# Patient Record
Sex: FEMALE | Race: BLACK OR AFRICAN AMERICAN | NOT HISPANIC OR LATINO | Employment: FULL TIME | URBAN - METROPOLITAN AREA
[De-identification: names, ages, dates, MRNs, and addresses within clinical notes are randomized per-mention and may not be internally consistent; named-entity substitution may affect disease eponyms.]

---

## 2018-01-24 DIAGNOSIS — H90.3 SENSORINEURAL HEARING LOSS, BILATERAL: Primary | ICD-10-CM

## 2018-01-31 ENCOUNTER — OFFICE VISIT (OUTPATIENT)
Dept: AUDIOLOGY | Facility: CLINIC | Age: 58
End: 2018-01-31
Payer: COMMERCIAL

## 2018-01-31 DIAGNOSIS — H90.3 SENSORINEURAL HEARING LOSS, BILATERAL: ICD-10-CM

## 2018-01-31 NOTE — MR AVS SNAPSHOT
After Visit Summary   2018    Sara Ritter    MRN: [id number]           Patient Information     Date Of Birth          1960        Visit Information        Provider Department      2018 8:00 AM Chiquis Carpenter AuD M [facility] Audiology        Today's Diagnoses     Sensorineural hearing loss, bilateral           Follow-ups after your visit        Your next 10 appointments already scheduled     Aug 13, 2018 10:00 AM CDT   Cochlear Implant Return with Abbey Price [facility] Audiology ([facility] [place])    [address] [place] [id number] [phone]              Who to contact     Please call your clinic at [phone] to:    Ask questions about your health    Make or cancel appointments    Discuss your medicines    Learn about your test results    Speak to your doctor   If you have compliments or concerns about an experience at your clinic, or if you wish to file a complaint, please contact [facility] Patient Relations at [phone] or email us at [email or web address]         Additional Information About Your Visit        MyChart Information     [employer] is an electronic gateway that provides easy, online access to your medical records. With [employer], you can request a clinic appointment, read your test results, renew a prescription or communicate with your care team.     To sign up for [employer] visit the website at [email or web address]   You will be asked to enter the access code listed below, as well as some personal information. Please follow the directions to create your username and password.     Your access code is: [email or web address]  Expires: 3/12/2018  4:18 PM     Your access code will  in 90 days. If you need help or a new code, please contact your [facility] Clinic or call [phone] for assistance.        Care EveryWhere ID     This is your Care EveryWhere  ID. This could be used by other organizations to access your [place] medical records  [id number]         Blood Pressure from Last 3 Encounters:   No data found for BP    Weight from Last 3 Encounters:   No data found for Wt              We Performed the Following     AUDIOLOGY ADULT REFERRAL     LT: Diagnostic Analysis of CI 7 yrs & over, Subsequent Programming   (44917)     RT: Diagnostic Analysis of CI 7 yrs & over, Subsequent Programming   (70536)        Primary Care Provider Office Phone # Fax #    Alvaro Fisher [phone]       [facility] [address] [address]        Equal Access to Services     [facility] AH: Hadii aad ku hadasho Soomaali, waaxda luqadaha, qaybta kaalmada adeegyada, waxay idiin hayaan adekathryn sales . So [facility] [phone].    ATENCIÓN: Si habla español, tiene a bazan disposición servicios gratuitos de asistencia lingüística. Llame al [phone].    We comply with applicable federal civil rights laws and [place] laws. We do not discriminate on the basis of race, color, national origin, age, disability, sex, sexual orientation, or gender identity.            Thank you!     Thank you for choosing [facility] AUDIOLOGY  for your care. Our goal is always to provide you with excellent care. Hearing back from our patients is one way we can continue to improve our services. Please take a few minutes to complete the written survey that you may receive in the mail after your visit with us. Thank you!             Your Updated Medication List - Protect others around you: Learn how to safely use, store and throw away your medicines at www.disposemymeds.org.      Notice  As of 1/31/2018  8:45 AM    You have not been prescribed any medications.

## 2018-01-31 NOTE — PROGRESS NOTES
AUDIOLOGY REPORT    BACKGROUND: Dr. Blayne Calderon implanted Sara Ritter with binaural  Advanced [employer]ak[employer]iusn[employer] HiRes 90k cochlear implants due to bilateral severe to profound sensorineural hearing loss and lack of benefit from hearing aids.  She received the left implant 11/24/2003 (after failure of [place] 1.2) and the right implant 10/11/2007.  The right implant was repositioned in June 2010.  She favors her left implant.      PATIENT REPORT: Sara was seen today 1/31/18 for bilateral cochlear implant programming in Audiology at the [facility] [place].      Patient reports she needs programming because her  reports she is not hearing as well.  She also feels her speech isn't as clear because she cannot monitor her volume as well due to the need for reprogramming.        FITTING SESSION: Dr. Blayne Calderon, cochlear implant surgeon, ordered today's appointment. The patient came to the clinic for adjustment to the programs in the external speech processor and for assessment of the external components of the cochlear implant system. These components provide power and data to the internal device. Sound is only heard once the external portion is activated. Postoperative treatment, including device fitting and adjustment, audiologic assessments, and training are required at regular intervals. Testing can include electropsychophysical measures of threshold, comfort, and loudness balancing, which are completed to update the program.    Processor type: Christin CI Q90 bilaterally.  [place] backups bilaterally but did not bring today.  Filters were changed on the Christin CI processors.  Normal listening checks.      Headpiece type: Universal headpiece (UHP)  Magnet strength: Christin processors: 1 magnet bilaterally - no irritation.  [place] processors: Left 1 magnet, right 2 magnets - no irritation.    TEST RESULTS:   Electrode Impedances: Stable and normal on active electrodes  "bilaterally  Neural Response Testing: Did not test  Facial Stimulation: Absent  Tinnitus: Absent  Balance Problems: Absent  Pain/Discomfort: Absent  Strategies Tried: Optima-S     Christin CI Q90 Programs Bilaterally (Processors are initialized to work on either ear):  1. (Left 137, Right 95) Everyday program - Clear Voice low, Processor bo + Tmic, IDR 60, no compression  2. (Left 138, Right 96) EchoBlock program - Clear Voice low, Processor bo + Tmic, IDR 60, no compression  3. (Left 137, Right 95) ZoomControl with front focus (will switch with remote) - Clear Voice low, Processor bo + Tmic, IDR 60, no compression  4. (Left 139, Right 97) Biking program with WindBlock - Clear Voice low, Processor bo + Tmic, IDR 60, no compression  5. (Left 137, Right 95) Tmic2 only program - Clear Voice low, IDR 60, no compression  QuickSync is enabled for programs and volume.  Standby mode is active.      Did not adjust [place] processors today since patient did not bring them along.      Number of Channels per Program:   Right 13 (#1 and 16 have been off, 15 was also turned off today since patient feels sound more than she hears it)  Left 15 (#1 is off)    COMMENTS: Most comfortable (M) levels were reshaped bilaterally.  For the right side, they decreased in general.  For the left side, they increased in the middle frequencies.  Upon going live, speech wasn't as \"sharp\" and therefore, M levels were increased 5 units on high frequency electrodes in right ear.  Volume was slightly too soft and M levels were globally raised 8 units bilaterally to achieve comfort.  Patient reported volume and quality were significantly improved after the changes and that her own voice sounded much better.      SUMMARY AND RECOMMENDATIONS: Sara was seen for bilateral cochlear implant programming.  M levels were adjusted bilaterally.  Patient reports she needs to get to work this morning and cannot stay for speech perception testing so an appointment " was scheduled for August 2018 when she is off work for summer break.  She will return for programming as needed in the meantime.      The patient expressed understanding and agreement with this plan.        Delmer Syed, CCC-A  Licensed Audiologist  [place] #[id number]

## 2018-06-06 ENCOUNTER — TELEPHONE (OUTPATIENT)
Dept: AUDIOLOGY | Facility: CLINIC | Age: 58
End: 2018-06-06

## 2018-06-06 NOTE — TELEPHONE ENCOUNTER
Received email from patient requesting SoundWave file for cochlear implant be sent to [facility] at [facility] for a research study she'll be participating in.  Release of information form is on file (scanned in Media tab).  File was sent to lab.      Delmer Syed, CCC-A  Licensed Audiologist  [place] #[id number]

## 2018-08-13 ENCOUNTER — OFFICE VISIT (OUTPATIENT)
Dept: AUDIOLOGY | Facility: CLINIC | Age: 58
End: 2018-08-13
Payer: COMMERCIAL

## 2018-08-13 DIAGNOSIS — H90.3 SENSORY HEARING LOSS, BILATERAL: Primary | ICD-10-CM

## 2018-08-13 NOTE — PROGRESS NOTES
AUDIOLOGY REPORT    METHOD: Speech perception testing is conducted at regular intervals to determine the degree of benefit the patient is obtaining from the cochlear implant (CI). Tests are conducted using the cochlear implant without the benefit of lipreading. All tests are conducted in a sound-treated room. Perception of monosyllabic words and words in sentences are tested. Results usually show improvement over time. A decrease in performance indicates the need for re-programming of the prosthesis and/or replacement of components.     INTERVAL: Left 14 1/2 years, Right almost 11 years     BACKGROUND: Dr. Blayne Calderon implanted Sara Ritter with binaural  Advanced Bionics HiRes 90k cochlear implants due to bilateral severe to profound sensorineural hearing loss and lack of benefit from hearing aids.  She received the left implant 11/24/2003 (after failure of [place] 1.2) and the right implant 10/11/2007.  The right implant was repositioned in June 2010.  She favors her left implant.    Dr. Calderon ordered today's visit.       PATIENT REPORT: Sara was seen today 8/13/18 in Audiology at the [facility] [place] for speech perception testing and cochlear implant programming.     Of note, the patient has been wearing a research PSP processor on the right ear since June, as part of a music study.       TEST RESULTS:  45 minutes were spent assessing the patient s auditory rehabilitation status.  Today s evaluation was ordered by Dr. Calderon.    Device(s) used for Testing: Synthorxnics Christin CI Q90 cochlear implant sound processors bilaterally. Normal listening check after microphone filter change.    Christin processors: 1 magnet bilaterally - no irritation.  [place] processors: Left 1 magnet, right 2 magnets - no irritation.    Tympanograms: Right normal, Left slightly shallow - similar to previous recordings and no otologic symptoms.     Unaided hearing: Left did not test since no  measurable hearing in past (profound sensorineural hearing loss); Right no measurable hearing today (only vibrotactile responses) suggesting profound sensorineural hearing loss (since no response to bone in past).      Soundfield Thresholds:   Left CI: Detection in mild loss rising to normal range (decreased 10 dB at 250 Hz re: 2/18/16 testing)  Right CI: Detection in normal to borderline normal range (stable)    CNC Words Test:  The patient repeats 25 single syllable words, auditory only. The words are presented at 60 dB SPL (conversational level) delivered from a CD player.    Preoperative Performance: 2%    Left replacement CI:   In 2004, 6 months at 70 dB SPL: 50-70%   8 years at 60 dB SPL: 88%  12 years at 60 dB SPL: 84%  14 1/2 years (today) at 60 dB A: 84%    Right CI:  In 2008, 3 months at 60 dB SPL: 56%  5 years at 60 dB SPL: 80%  8 years at 60 dB SPL: 68%  Almost 11 years (today) at 60 dB A: 68%    Bilateral CI:  5 years at 60 dB SPL: 92%  8 years at 60 dB SPL: 92%  Almost 11 years (today): did not test due to high score in left unilateral condition (unlikely to see bilateral advantage)    The Hearing in Noise Test (HINT):  The patient repeats 20 sentences, auditory only.   The sentences are presented in each condition at 60 dB SPL (conversational level)delivered from a CD player.    Preoperative Performance: 0%    Left replacement CI:   In 2004, 6 months at 70 dB SPL: 75-87%   8 years at 60 dB SPL: 100%  Did not test today due to previous ceiling results.     Right CI:  In 2008, 3 months at 60 dB SPL: 51%  5 years at 60 dB SPL: 100%  Did not test today due to previous ceiling results.       AzBio Sentences Test:  The patient repeats 20 sentences, auditory only.  The sentences are presented at 60 dB A (conversational level) delivered from a CD player.     Preoperative Performance: Did not test    Left CI:  12 years: 99% quiet; 90% with +10 dB signal to noise ratio (SNR)  14 1/2 years (today): 97% quiet;  58% with +10 dB signal to noise ratio (SNR)    Right CI:  8 years: 89% quiet; 78% with +10 dB SNR  Almost 11 years (today): 95% quiet; 65% with +10 dB SNR    Bilateral CI:  April 2012 at +5 dB SNR: Non-Clear voice program: 23%; Clear Voice medium: 58%   8 years: 64% with +5 dB SNR (Did not test in quiet or +10 dB SNR due to high performance in unilateral conditions)   Almost 11 years (today): 77% with +10 dB SNR (Did not test in quiet due to high performance in unilateral conditions)     Of note, the noise scores were poorer today while the patient denies any subjective changes.  This could be related to different calibration since the 2016 results were obtained with our old equipment in the old facility.  It could also be related to the patient's lack of guessing today, her recent use of a research processor and/or the need for programming on the left side (audibility decreased at 250 Hz).      After testing, programming was completed on the left side, due to the changes in audibility at 250 Hz.  Impedances were tested bilaterally.       FITTING SESSION: Dr. Blayne Calderon, cochlear implant surgeon, ordered today's appointment. The patient came to the clinic for adjustment to the programs in the external speech processor and for assessment of the external components of the cochlear implant system. These components provide power and data to the internal device. Sound is only heard once the external portion is activated. Postoperative treatment, including device fitting and adjustment, audiologic assessments, and training are required at regular intervals. Testing can include electropsychophysical measures of threshold, comfort, and loudness balancing, which are completed to update the program.    Processor type: Christin CI Q90 bilaterally.  [place] backups bilaterally but did not bring today.  Filters were changed on the Christin CI processors.  Normal listening checks.      Headpiece type: Universal headpiece  (UHP)  Magnet strength: Christin processors: 1 magnet bilaterally - no irritation.  [place] processors: Left 1 magnet, right 2 magnets - no irritation.    TEST RESULTS:   Electrode Impedances: Essentially stable and normal on active electrodes bilaterally; SoundWave recommended a slight increase to the pulse width on left side (increased from 33.2 to 37.7).  No changes were recommended on the right side.    Neural Response Testing: Did not test  Facial Stimulation: Absent  Tinnitus: Absent  Balance Problems: Absent  Pain/Discomfort: Absent  Strategies Tried: Optima-S     Christin CI Q90 Programs Bilaterally (Processors are initialized to work on either ear):  1. (Left 140, Right 95) Everyday program - Clear Voice low, Processor bo + Tmic, IDR 60, no compression  2. (Left 141, Right 96) EchoBlock program - Clear Voice low, Processor bo + Tmic, IDR 60, no compression  3. (Left 140, Right 95) ZoomControl with front focus (will switch with remote) - Clear Voice low, Processor bo + Tmic, IDR 60, no compression  4. (Left 142, Right 97) Biking program with WindBlock - Clear Voice low, Processor bo + Tmic, IDR 60, no compression  5. (Left 140, Right 95) Tmic2 only program - Clear Voice low, IDR 60, no compression  QuickSync is enabled for programs and volume.  Standby mode is active.      Did not adjust [place] processors today since patient did not bring them along.      Number of Channels per Program:   Right 13 (#1 and 16 have been off, 15 was also turned off today since patient feels sound more than she hears it)  Left 15 (#1 is off)    For the right side, impedances were stable and no pulse width changes were recommended.  Since audibility was stable, no programming changes were recommended.  Programming charges were modified since only impedances were checked.     For the left side, pulse width was widened from 33.2 to 37.7.  Most comfortable (M) levels were measured in the low frequencies, due to the change in  audibility noted on the audiogram at 250 Hz.  On electrode #2 (the most apical active electrode; #1 is off), M level increased 20 units.  M levels did not change on other adjacent electrodes.    After the changes, both implants were activated and the patient felt they were balanced and the sound quality was good.        SUMMARY AND RECOMMENDATIONS: Sara was seen for bilateral cochlear implant testing and right cochlear implant programming.  Test results suggest stable speech perception scores in quiet conditions.  Noise scores decreased but could have been affected by calibration/equipment, lack of guessing, recent use of a research processor or need for programming.  We will monitor.  Today's scores were more in line with typical noise performance.      Since audibility decreased at 250 Hz in the left ear, programming was completed and pulse width was also increased based on impedances.  Impedances and audibility were stable in right CI so no programming changes were made (charges were modified since only impedances were checked on right side).      Middle ear status is consistent with previous visits.  In the unaided condition, the patient has a profound sensorineural hearing loss bilaterally (no measurable hearing in either ear).  Patient will continue to follow up with Dr. Calderon for any medical issues related to her cochlear implants or ears in the future.    I've advised her to return for testing in 1 year or sooner programming as needed.  She would like to schedule a programming visit before the end of the year, in case it is needed.  She will bring backup processors for updating at her return visit.      The patient expressed understanding and agreement with this plan.      Delmer Syed, CCC-A  Licensed Audiologist  [place] #[id number]      Enclosure: audiogram

## 2018-08-13 NOTE — MR AVS SNAPSHOT
After Visit Summary   2018    Sara Ritter    MRN: [id number]           Patient Information     Date Of Birth          1960        Visit Information        Provider Department      2018 10:00 AM Chiquis Carpenter AuD M [facility] Audiology        Today's Diagnoses     Sensory hearing loss, bilateral    -  1       Follow-ups after your visit        Who to contact     Please call your clinic at [phone] to:    Ask questions about your health    Make or cancel appointments    Discuss your medicines    Learn about your test results    Speak to your doctor            Additional Information About Your Visit        MyChart Information     [employer] is an electronic gateway that provides easy, online access to your medical records. With [employer], you can request a clinic appointment, read your test results, renew a prescription or communicate with your care team.     To sign up for [employer] visit the website at [email or web address]   You will be asked to enter the access code listed below, as well as some personal information. Please follow the directions to create your username and password.     Your access code is: [email or web address]  Expires: 10/28/2018  6:31 AM     Your access code will  in 90 days. If you need help or a new code, please contact your [facility] Clinic or call [phone] for assistance.        Care EveryWhere ID     This is your Care EveryWhere ID. This could be used by other organizations to access your [place] medical records  [id number]         Blood Pressure from Last 3 Encounters:   No data found for BP    Weight from Last 3 Encounters:   No data found for Wt              We Performed the Following     AUDIOGRAM/TYMPANOGRAM - INTERFACE     Audiometry/Air   (97358)     Eval of Aud Rehab Status (60 min)   (41575)     LT: Diagnostic Analysis of CI 7 yrs & over, Subsequent Programming   (77519)     RT: Diagnostic Analysis of CI 7 yrs & over,  Subsequent Programming   (95203)     Tympanometry (impedance - testing) (82118)        Primary Care Provider Office Phone # Fax #    Alvaro Fisher [phone]       [facility] [address] [place] [address]  [place] [address]        Equal Access to Services     [facility] : Hadii aad ku hadasho Soomaali, waaxda luqadaha, qaybta kaalmada adeegyada, waxay idiin hayaan adeeg kharash la'aan . So [facility] [phone].    ATENCIÓN: Si habla español, tiene a bazan disposición servicios gratuitos de asistencia lingüística. Chrissieame al [phone].    We comply with applicable federal civil rights laws and [place] laws. We do not discriminate on the basis of race, color, national origin, age, disability, sex, sexual orientation, or gender identity.            Thank you!     Thank you for choosing [facility] AUDIOLOGY  for your care. Our goal is always to provide you with excellent care. Hearing back from our patients is one way we can continue to improve our services. Please take a few minutes to complete the written survey that you may receive in the mail after your visit with us. Thank you!             Your Updated Medication List - Protect others around you: Learn how to safely use, store and throw away your medicines at www.disposemymeds.org.      Notice  As of 8/13/2018  4:08 PM    You have not been prescribed any medications.

## 2019-01-28 DIAGNOSIS — Z96.21 COCHLEAR IMPLANT STATUS: Primary | ICD-10-CM

## 2019-06-23 NOTE — PROGRESS NOTES
AUDIOLOGY REPORT    BACKGROUND: Dr. Blayne Calderon implanted Sara Ritter with binaural  [employer] HiRes 90k cochlear implants due to bilateral severe to profound sensorineural hearing loss and lack of benefit from hearing aids.  She received the left implant 11/24/2003 (after failure of [place] 1.2) and the right implant 10/11/2007.  The right implant was repositioned in June 2010.  She favors her left implant.    The patient is being seen on 6/24/19 for bilateral cochlear implant programming.     PATIENT REPORT: Patient reports she hasn't been understanding her  as well as in the past.  She has postponed programming since she was participating in a research study, which is now complete.  Patient would also like to create a music program.        FITTING SESSION: Dr. Blayne Calderon, cochlear implant surgeon, ordered today's appointment. The patient came to the clinic for adjustment to the programs in the external speech processor and for assessment of the external components of the cochlear implant system. These components provide power and data to the internal device. Sound is only heard once the external portion is activated. Postoperative treatment, including device fitting and adjustment, audiologic assessments, and training are required at regular intervals. Testing can include electropsychophysical measures of threshold, comfort, and loudness balancing, which are completed to update the program.    Processor type: Christin CI Q90 bilaterally.  Marlyn backups updated today.  Patient is unsure if she has [place] backups.  Filters were changed on the Christin CI processors.  Normal listening checks.      Headpiece type: Universal headpiece (UHP)  Magnet strength: Christin processors: 1 magnet bilaterally - no irritation.      TEST RESULTS:   Electrode Impedances: Essentially stable and normal on active electrodes bilaterally  Neural Response Testing: Did not test  Facial Stimulation: Absent  Tinnitus:  Absent  Balance Problems: Absent  Pain/Discomfort: Absent  Strategies Tried: [place]-S in Christin CI, HiRes S in Marlyn    Christin CI Q90 Programs Bilaterally (Processors are initialized to work on either ear):  1. (Left 147, Right 102) Everyday program - Clear Voice low, Processor bo + Tmic, IDR 60, no compression  2. (Left 147, Right 102) EchoBlock program - Clear Voice low, Processor bo + Tmic, IDR 60, no compression  3. (Left 147, Right 102) ZoomControl with front focus (will switch with remote) - Clear Voice low, Processor bo + Tmic, IDR 60, no compression  4. (Left 147, Right 102) Biking program with WindBlock - Clear Voice low, Processor bo + Tmic, IDR 60, no compression  5. (Left 148, Right 103) Music - Clear Voice off, Processor bo + Tmic, IDR 80, no compression  QuickSync is enabled for programs and volume.  Standby mode is active.  ComPilot 50% in programs 1, 2, 4; 100% in programs 3 and 5      Marlyn Programs Bilaterally (All use HiResolution S):  1. (100 right; 145 left) Everyday, IDR 60, 30:70 mixing  2. (100 right; 145 left) 50:50 mixing program with IDR 60  3. (101 right; 145 left) Noise, IDR 50, sensitivity -3, 30:70 mixing     Did not adjust [place] processors today since patient did not bring them along.      Number of Channels per Program:   Right 13 (#1 and 16 have been off, 15 was also turned off today since patient feels sound more than she hears it)  Left 15 (#1 is off)    Most comfortable (M) levels were measured with tone bursts bilaterally and were reshaped.  Upon going live, the left M levels needed to be globally raised 20 and the right had to be raised 8 units to achieve sufficient loudness and balance.  A music program was created with ClearVoice off and IDR expanded from 60 to 80.  The backup Marlyn processors were also updated.        SUMMARY AND RECOMMENDATIONS: Sara was seen for bilateral cochlear implant programming.  She will return in 6 months for additional programming and in 1  year for speech perception testing.  She will bring backup [place] processors for updating at her return visit, if she locates them.      The patient expressed understanding and agreement with this plan.        Delmer Syed, CCC-A  Licensed Audiologist  [place] #[id number]

## 2019-06-24 ENCOUNTER — OFFICE VISIT (OUTPATIENT)
Dept: AUDIOLOGY | Facility: CLINIC | Age: 59
End: 2019-06-24
Payer: COMMERCIAL

## 2019-06-24 DIAGNOSIS — H90.3 SENSORY HEARING LOSS, BILATERAL: Primary | ICD-10-CM

## 2019-12-27 NOTE — PROGRESS NOTES
AUDIOLOGY REPORT    BACKGROUND: Dr. Blayne Calderon implanted Sara Ritter with binaural  Advanced [employer] HiRes 90k cochlear implants due to bilateral severe to profound sensorineural hearing loss and lack of benefit from hearing aids.  She received the left implant 11/24/2003 (after failure of [place] 1.2) and the right implant 10/11/2007.  The right implant was repositioned in June 2010.  She favors her left implant.    The patient is being seen on 12/30/19 for bilateral cochlear implant programming.     PATIENT REPORT: Patient reports she would like to try the right magnet increased slightly.  She had ear pain in right ear over one month ago which resolved.  No ongoing issues.  She feels she needs a tune up of her bilateral cochlear implant programming.       FITTING SESSION: Dr. Blayne Calderon, cochlear implant surgeon, ordered today's appointment. The patient came to the clinic for adjustment to the programs in the external speech processor and for assessment of the external components of the cochlear implant system. These components provide power and data to the internal device. Sound is only heard once the external portion is activated. Postoperative treatment, including device fitting and adjustment, audiologic assessments, and training are required at regular intervals. Testing can include electropsychophysical measures of threshold, comfort, and loudness balancing, which are completed to update the program.    Processor type: Christin CI Q90 bilaterally.  Marlyn backups updated today.  Filters were changed on the Christin CI processors.  Normal listening checks.      Headpiece type: Universal headpiece (UHP)  Magnet strength: Christin processors: 1 standard magnet left - no irritation or discomfort, 1 standard + 1 thin right - increased today and patient will monitor.    TEST RESULTS:   Tympanograms: Right normal - rechecked due to pain about 1 month ago which resolved.  Left did not test.   Electrode Impedances:  Essentially stable and normal on active electrodes bilaterally  Neural Response Testing: Did not test  Facial Stimulation: Absent  Tinnitus: Absent  Balance Problems: Absent  Pain/Discomfort: Absent  Strategies Tried: [place]-S in Christin CI, HiRes S in Marlyn    Christin CI Q90 Programs Bilaterally (Processors are initialized to work on either ear):  1. (Left 149, Right 104) Everyday program - Clear Voice low, Processor bo + Tmic, IDR 60, no compression  2. (Left 149, Right 104) EchoBlock program - Clear Voice low, Processor bo + Tmic, IDR 60, no compression  3. (Left 149, Right 104) ZoomControl with front focus (will switch with remote) - Clear Voice low, Processor bo + Tmic, IDR 60, no compression  4. (Left 149, Right 104) Biking program with WindBlock - Clear Voice low, Processor bo + Tmic, IDR 60, no compression  5. (Left 150, Right 105) Music - Clear Voice off, Processor bo + Tmic, IDR 80, no compression  QuickSync is enabled for programs and volume.  Standby mode is active.  ComPilot 50% in programs 1, 2, 4; 100% in programs 3 and 5      Marlyn Programs Bilaterally (All use HiResolution S):  1. (106 right; 151 left) Everyday, IDR 60, 30:70 mixing  2. (106 right; 151 left) 50:50 mixing program with IDR 60  3. (107 right; 152 left) Noise, IDR 50, sensitivity -3, 30:70 mixing     Patient reports she no longer has [place] backups.      Number of Channels per Program:   Right 13 (#1 and 16 have been off, 15 was also turned off today since patient feels sound more than she hears it)  Left 15 (#1 is off)    Most comfortable (M) levels were measured with tone bursts bilaterally and were reshaped.  Upon going live, the left M levels needed to be globally raised 4 units to achieve balance.  A music program was created with ClearVoice off and IDR expanded from 60 to 80.  The backup Marlyn processors were also updated.      SUMMARY AND RECOMMENDATIONS: Sara was seen for bilateral cochlear implant programming.  She will  return in summer of 2020 for speech perception testing.  In the meantime, she will monitor the right magnet strength.  Right tympanogram is normal and ear pain has resolved but she should see ENT if it returns.      The patient expressed understanding and agreement with this plan.        Delmer Syed, CCC-A, [facility]  Licensed Audiologist  [place] #[id number]

## 2019-12-30 ENCOUNTER — OFFICE VISIT (OUTPATIENT)
Dept: AUDIOLOGY | Facility: CLINIC | Age: 59
End: 2019-12-30
Payer: COMMERCIAL

## 2019-12-30 DIAGNOSIS — H90.3 SENSORY HEARING LOSS, BILATERAL: Primary | ICD-10-CM

## 2020-08-03 DIAGNOSIS — Z96.21 COCHLEAR IMPLANT STATUS: Primary | ICD-10-CM

## 2020-08-06 ENCOUNTER — TELEPHONE (OUTPATIENT)
Dept: AUDIOLOGY | Facility: CLINIC | Age: 60
End: 2020-08-06

## 2020-08-06 NOTE — TELEPHONE ENCOUNTER
Received email from patient.  She would like to cancel her 8/10/2020 appt.  She lost her job and needs to wait until she returns to work for insurance reasons.  Appt was canceled.      Delmer Syed, CCC-A, [facility]  Licensed Audiologist  [place] #[id number]

## 2022-07-14 DIAGNOSIS — Z96.21 COCHLEAR IMPLANT STATUS: Primary | ICD-10-CM

## 2022-07-19 NOTE — PROGRESS NOTES
AUDIOLOGY REPORT    BACKGROUND: Dr. Blayne Calderon implanted Sara Ritter with binaural  Advanced [employer] HiRes 90k cochlear implants due to bilateral severe to profound sensorineural hearing loss and lack of benefit from hearing aids.  She received the left implant 11/24/2003 (after failure of [place] 1.2) and the right implant 10/11/2007.  The right implant was repositioned in June 2010.  She favors her left implant.    The patient is being seen on 7/25/2022 at the [facility] [place] for bilateral cochlear implant programming. Dr. Andra Smith ordered today's visit.     PATIENT REPORT: Sara feels she needs a tune up of her bilateral cochlear implant programming.  She is not interested in upgrading to M90 at this time due to insurance concerns and she is aware that Q90 is obsolete.       FITTING SESSION: Dr. Andra Smith ordered today's appointment. The patient came to the clinic for adjustment to the programs in the external speech processor and for assessment of the external components of the cochlear implant system. These components provide power and data to the internal device. Sound is only heard once the external portion is activated. Postoperative treatment, including device fitting and adjustment, audiologic assessments, and training are required at regular intervals. Testing can include electropsychophysical measures of threshold, comfort, and loudness balancing, which are completed to update the program.    Processor type: Christin CI Q90 bilaterally.  Marlyn backups updated today.  Filters were changed on the Christin CI processors.  Normal listening checks.      Headpiece type: Universal headpiece (UHP)  Magnet strength: Christin processors: 1 standard magnet left - no irritation or discomfort, 1 standard + 1 thin right - no irritation or discomfort.     TEST RESULTS:   Tympanograms: Reported below   Electrode Impedances: Essentially stable and normal on active electrodes  "bilaterally  Neural Response Testing: Did not test  Facial Stimulation: Absent  Tinnitus: Absent  Balance Problems: Absent  Pain/Discomfort: Absent  Strategies Tried: [place]-S in Christin CI, HiRes S in Marlyn    Christin CI Q90 Programs Bilaterally (Processors are initialized to work on either ear):  1. (Left 153, Right 108) Everyday program - Clear Voice low, Processor bo + Tmic, IDR 60, no compression  2. (Left 153, Right 108) EchoBlock program - Clear Voice low, Processor bo + Tmic, IDR 60, no compression  3. (Left 153, Right 108) ZoomControl with front focus (will switch with remote) - Clear Voice low, Processor bo + Tmic, IDR 60, no compression  4. (Left 153, Right 108) Biking program with WindBlock - Clear Voice low, Processor bo + Tmic, IDR 60, no compression  5. (Left 154, Right 109) Music - Clear Voice off, Processor bo + Tmic, IDR 80, no compression  QuickSync is enabled for programs and volume.  Standby mode is active.  ComPilot 50% in programs 1, 2, 4; 100% in programs 3 and 5      Marlyn Programs Bilaterally (All use HiResolution S): Patient did not bring for updating   1. (106 right; 151 left) Everyday, IDR 60, 30:70 mixing  2. (106 right; 151 left) 50:50 mixing program with IDR 60  3. (107 right; 152 left) Noise, IDR 50, sensitivity -3, 30:70 mixing     Patient reports she no longer has [place] backups.      Number of Channels per Program:   Right 13 (#1 and 16 have been off, 15 was also turned off today since patient feels sound more than she hears it)  Left 15 (#1 is off)    Most comfortable (M) levels were measured with tone bursts bilaterally and were reshaped.  Upon going live, the bilateral M levels needed to be globally raised 16 units to achieve comfort. She reported the high frequencies were not sounding \"crisp\" enough, and therefore, M levels were raised 10 units on the three most basal electrodes on both sides.  A music program was created with ClearVoice off and IDR expanded from 60 to 80. "     Testing was completed after programming.      METHOD: Speech perception testing is conducted at regular intervals to determine the degree of benefit the patient is obtaining from the cochlear implant (CI). Tests are conducted using the cochlear implant without the benefit of lipreading. All tests are conducted in a sound-treated room. Perception of monosyllabic words and words in sentences are tested. Results usually show improvement over time. A decrease in performance indicates the need for re-programming of the prosthesis and/or replacement of components.     INTERVAL: Left 19 years, Right 15 years     TEST RESULTS:  45 minutes were spent assessing the patient s auditory rehabilitation status.      Device(s) used for Testing: Jymob CI Q90 cochlear implant sound processors bilaterally. Normal listening check after microphone filter change.      Tympanograms: Slightly shallow bilaterally - similar to previous recordings and no otologic symptoms.     Unaided hearing: Did not test since no response bilaterally in past    Soundfield Thresholds:   Left CI: Detection in mild loss rising to normal range (stable)  Right CI: Detection in normal to borderline normal range (stable)    CNC Words Test:  The patient repeats 25 single syllable words, auditory only. The words are presented at 60 dB SPL (conversational level) delivered from a CD player.    Preoperative Performance: 2%    Left replacement CI:   In 2004, 6 months at 70 dB SPL: 50-70%   8 years at 60 dB SPL: 88%  12 years at 60 dB SPL: 84%  14 1/2 years at 60 dB A: 84%  19 years (today): 76% - stable    Right CI:  In 2008, 3 months at 60 dB SPL: 56%  5 years at 60 dB SPL: 80%  8 years at 60 dB SPL: 68%  Almost 11 years at 60 dB A: 68%  15 years (today): 84% - slightly improved    Bilateral CI:  5 years at 60 dB SPL: 92%  8 years at 60 dB SPL: 92%  Almost 11 years: did not test due to high score in left unilateral condition (unlikely to see  bilateral advantage)  15 years (today): did not test due to high score in left unilateral condition (unlikely to see bilateral advantage)    The Hearing in Noise Test (HINT):  The patient repeats 20 sentences, auditory only.   The sentences are presented in each condition at 60 dB SPL (conversational level)delivered from a CD player.    Preoperative Performance: 0%    Left replacement CI:   In 2004, 6 months at 70 dB SPL: 75-87%   8 years at 60 dB SPL: 100%  Did not test today due to previous ceiling results.     Right CI:  In 2008, 3 months at 60 dB SPL: 51%  5 years at 60 dB SPL: 100%  Did not test today due to previous ceiling results.       [employer] Sentences Test:  The patient repeats 20 sentences, auditory only.  The sentences are presented at 60 dB A (conversational level) delivered from a CD player.     Preoperative Performance: Did not test    Left CI:  12 years: 99% quiet; 90% with +10 dB signal to noise ratio (SNR)  14 1/2 years: 97% quiet; 58% with +10 dB signal to noise ratio (SNR)  19 years (today): 95% quiet - stable    Right CI:  8 years: 89% quiet; 78% with +10 dB SNR  Almost 11 years: 95% quiet; 65% with +10 dB SNR   15 years (today): 89% - stable     Bilateral CI:  April 2012 at +5 dB SNR: Non-Clear voice program: 23%; Clear Voice medium: 58%   8 years: 64% with +5 dB SNR (Did not test in quiet or +10 dB SNR due to high performance in unilateral conditions)   Almost 11 years: 77% with +10 dB SNR (Did not test in quiet due to high performance in unilateral conditions)   15 years (today): 75% with +10 dB SNR (Did not test in quiet due to high performance in unilateral conditions)       SUMMARY AND RECOMMENDATIONS: Sara was seen for bilateral cochlear implant programming. After programming, audibility and speech reception scores were generally stable bilaterally with the exception of the right CNC word score which improved slightly. Patient should return in 1-2 years for repeat testing or sooner if  programming is needed. She will contact Evi if she becomes interested in upgrading to M90 processors in the future.     Tympanograms were shallow as they have been in the past. Patient doesn't report any ear symptoms but will follow up with ENT if any concerns arise in the future.     The patient expressed understanding and agreement with this plan.      Janette Golden M.A.   Audiology Doctoral Student   [place] #[id number]      I was present with the patient for the entire Audiology appointment including all procedures/testing performed by the AuD student, and agree with the student s assessment and plan as documented.    Delmre Syed, CCC-A, [facility]  Licensed Audiologist  [place] #[id number]

## 2022-07-25 ENCOUNTER — OFFICE VISIT (OUTPATIENT)
Dept: AUDIOLOGY | Facility: CLINIC | Age: 62
End: 2022-07-25
Payer: COMMERCIAL

## 2022-07-25 DIAGNOSIS — H90.3 SENSORINEURAL HEARING LOSS (SNHL) OF BOTH EARS: Primary | ICD-10-CM

## 2022-07-25 PROCEDURE — 92604 REPROGRAM COCHLEAR IMPLT 7/>: CPT | Mod: LT | Performed by: AUDIOLOGIST-HEARING AID FITTER

## 2022-07-25 PROCEDURE — 92626 EVAL AUD FUNCJ 1ST HOUR: CPT | Mod: 59 | Performed by: AUDIOLOGIST-HEARING AID FITTER

## 2022-07-25 PROCEDURE — 92567 TYMPANOMETRY: CPT | Mod: 59 | Performed by: AUDIOLOGIST-HEARING AID FITTER

## 2024-04-04 ENCOUNTER — TELEPHONE (OUTPATIENT)
Dept: AUDIOLOGY | Facility: CLINIC | Age: 64
End: 2024-04-04
Payer: COMMERCIAL

## 2024-04-04 NOTE — TELEPHONE ENCOUNTER
[facility] Call Center    Phone Message    May a detailed message be left on voicemail: yes     Reason for Call: Other: Pt said she has some questions regarding her upgrade and needs to do next steps to get this going, please call her to discuss further, thanks     Action Taken: Other: AUDIO    Travel Screening: Not Applicable

## 2024-04-05 NOTE — TELEPHONE ENCOUNTER
LVM for Sara - needs insurance update in Epic, questions for AB upgrade, schedule appts.     -Ramona CRUZ 4/5/24

## 2024-04-15 NOTE — TELEPHONE ENCOUNTER
Call attempt #3  LVM on mobile # for Sara that I need more info for AB upgrade request, as well as current insurance info. Gave direct #    -Ramona CRUZ 4/15/24

## 2024-04-19 NOTE — TELEPHONE ENCOUNTER
Spoke with Sara informing her we needed an insurance update in our system and information regarding condition/issues with her current CI processors for upgrade request to [employer].     Sara said her Q90 processors both have issues with sound cutting out intermittently. The devices go silent unexpectedly and she knows it's not the cable as she has worked to replace those. She also notices the frequency of sound cutting out increases when the processor gets warm or is in humid conditions.   Battery life is significantly reduced, lasting only 4 hours at a time. Has had to replace the cable multiple times due to breaking. Batteries have cracked cases near the charging port.  is cracked/unusable in one charging slot.     Chose 2 dates for after she is done with her job (summer break) and will schedule those after confirming with provider.    -Ramona CRUZ 4/16/24

## 2024-04-21 ENCOUNTER — TELEPHONE (OUTPATIENT)
Dept: AUDIOLOGY | Facility: CLINIC | Age: 64
End: 2024-04-21
Payer: COMMERCIAL

## 2024-04-21 NOTE — TELEPHONE ENCOUNTER
Patient is requesting bilateral [place] cochlear implant sound processor upgrade due to the following issues:    Sound cutting out intermittently. The devices go silent unexpectedly and she knows it's not the cable as she has worked to replace those. She also notices the frequency of sound cutting out increases when the processor gets warm or is in humid conditions.  When sound cuts out, it makes communication difficult and also poses safety risks to the patient.    2. Has had to replace the cable multiple times due to breaking.   3. Battery life is significantly reduced, lasting only 4 hours at a time.  Batteries have cracked cases near the charging port.   4.  is cracked/unusable in one charging slot.     Letter of medical necessity will be submitted for bilateral upgrade request.    Delmer Syed, CCC-A, [facility]  Licensed Audiologist  [place] #[id number]

## 2024-04-22 ENCOUNTER — DOCUMENTATION ONLY (OUTPATIENT)
Dept: OTOLARYNGOLOGY | Facility: CLINIC | Age: 64
End: 2024-04-22
Payer: COMMERCIAL

## 2024-04-22 NOTE — PROGRESS NOTES
Letter of Medical Necessity written and routed to Dr. Andra Smith for signature and submission to [employer] in regard to bilateral M90 processor request. Phone note from audiologist included for medical necessity.     Corresponding audiology appts scheduled, visit reminder mailed.    Ramona CRUZ, CI Coordinator 4/22/24

## 2024-05-06 DIAGNOSIS — Z96.21 COCHLEAR IMPLANT STATUS: Primary | ICD-10-CM

## 2024-06-11 ENCOUNTER — TELEPHONE (OUTPATIENT)
Dept: AUDIOLOGY | Facility: CLINIC | Age: 64
End: 2024-06-11
Payer: COMMERCIAL

## 2024-06-11 NOTE — TELEPHONE ENCOUNTER
I have reviewed the notes, assessments, and/or procedures performed by Gissel Tran RN, IBCLC, I concur with her/his documentation of Carola Sultana MD 04/27/24         LVM for Sara saying we'd noticed scheduled appointments had been pushed out and wondering if she had info update on CI upgrade submission, or letting her know she could reach out to us if any questions. Gave direct callback #    Ramona M 6/11/24

## 2024-09-09 ENCOUNTER — TELEPHONE (OUTPATIENT)
Dept: AUDIOLOGY | Facility: CLINIC | Age: 64
End: 2024-09-09
Payer: COMMERCIAL

## 2024-09-09 NOTE — TELEPHONE ENCOUNTER
Could not leave voicemail on home phone as memory was full.    LVM on pt cell asking about status of CI upgrade and reminding of upcoming appts. Asked for callback regrading appts/upgrade inquiry. Gave direct #    Ramona CRUZ 9/9/24

## 2024-09-11 NOTE — PROGRESS NOTES
AUDIOLOGY REPORT    BACKGROUND: Dr. Blayne Calderon implanted Sara Ritter with binaural  Advanced Bionics HiRes 90k cochlear implants due to bilateral severe to profound sensorineural hearing loss and lack of benefit from hearing aids.  She received the left implant 11/24/2003 (after failure of [place] 1.2) and the right implant 10/11/2007.  The right implant was repositioned in June 2010.  She favors her left implant.    The patient is being seen on 9/16/2024 at the [facility] [place] for bilateral cochlear implant programming and equipment upgrade. Dr. Andra Smith ordered today's visit.     PATIENT REPORT: Sara arrives with her Christin CI M90 upgrade kits.  Patient understands that processors no longer work on both ears.  She reports she chose waterproof rechargeable batteries packs as her accessories, but they are on backorder.      FITTING SESSION: Dr. Andra Smith ordered today's appointment. The patient came to the clinic for adjustment to the programs in the external speech processor and for assessment of the external components of the cochlear implant system. These components provide power and data to the internal device. Sound is only heard once the external portion is activated. Postoperative treatment, including device fitting and adjustment, audiologic assessments, and training are required at regular intervals. Testing can include electropsychophysical measures of threshold, comfort, and loudness balancing, which are completed to update the program.    Processor type: Christin CI M90 fit bilaterally today;  Backups: Christin CI Q90 and Marlyn bilaterally.        Headpiece type: Slim HP for M90; Universal headpiece (UHP) for older processors   Magnet strength: Slim HP: 2 Right, 2 Left.  *Patient was also given a set of #2 magnets for her waterproof headpieces, which she did not bring today.  UHP: 1 standard magnet left - no irritation or discomfort, 1 standard + 1 thin right - no  irritation or discomfort.     TEST RESULTS:   Tympanograms: Did not test  Electrode Impedances: Essentially stable and normal on active electrodes bilaterally  Neural Response Testing: Did not test  Facial Stimulation: Absent  Tinnitus: Absent  Balance Problems: Absent  Pain/Discomfort: Absent  Strategies Tried: [place]-S in Christin CI, HiRes S in Marlyn    Christin CI M90 Programs Bilaterally:   Manual programs:  1. Start up program: Calm  2. Music  3. Speech in 360 - Left Focus, Direct Touch active  4. Off the ear for waterproof rechargeable battery pack  Patient has access to AutoSense but did not want it as the starting program.    NOTE: Patient prefers programs in Linear and prefers ClearVoice low. WindBlock was turned on in all programs except Music and streaming programs.     Christin CI Q90 Programs Bilaterally (Processors are initialized to work on either ear):  1. (Left 153, Right 108) Everyday program - Clear Voice low, Processor bo + Tmic, IDR 60, no compression  2. (Left 153, Right 108) EchoBlock program - Clear Voice low, Processor bo + Tmic, IDR 60, no compression  3. (Left 153, Right 108) ZoomControl with front focus (will switch with remote) - Clear Voice low, Processor bo + Tmic, IDR 60, no compression  4. (Left 153, Right 108) Biking program with WindBlock - Clear Voice low, Processor bo + Tmic, IDR 60, no compression  5. (Left 154, Right 109) Music - Clear Voice off, Processor bo + Tmic, IDR 80, no compression  QuickSync is enabled for programs and volume.  Standby mode is active.  ComPilot 50% in programs 1, 2, 4; 100% in programs 3 and 5      Marlyn Programs Bilaterally (All use HiResolution S): Patient did not bring for updating   1. (106 right; 151 left) Everyday, IDR 60, 30:70 mixing  2. (106 right; 151 left) 50:50 mixing program with IDR 60  3. (107 right; 152 left) Noise, IDR 50, sensitivity -3, 30:70 mixing     Patient reports she no longer has [place] backups.      Number of Channels per  Program:   Right 13 (#1 and 16 have been off, 15 was also turned off today since patient feels sound more than she hears it)  Left 15 (#1 is off)    Both [place] processors were updated in Target CI 1.5 to allow future remote support if needed.  Preferred Q90 settings were migrated from SoundWave into Target CI for in the the M90 processors.  Upon going live in the M90 processors, most comfortable (M) levels needed to be globally decreased 8 units bilaterally to achieve comfort.  Manual programs were created as listed above, with Calm as the starting program at patient request.      All of the equipment in the Christin CI M90 processor kits was reviewed.  The processors were paired to the patient's iPhone and the AB remote lia.  The patient's questions were answered.  Warranties were reviewed.     SUMMARY AND RECOMMENDATIONS: Sara was seen for bilateral cochlear implant programming and upgrade to Christin CI M90 processors.  She will return in 1-2 months for follow up adjustments (if needed) and speech perception testing.      The patient expressed understanding and agreement with this plan.      Delmer Syed, CCC-A, [facility]  Licensed Audiologist  [place] #[id number]

## 2024-09-12 NOTE — TELEPHONE ENCOUNTER
Sara called saying that she received her AB upgrade, will charge batteries and attempt to download AB remote lia. Writer will message provider.     Ramona 9/12/24

## 2024-09-16 ENCOUNTER — OFFICE VISIT (OUTPATIENT)
Dept: AUDIOLOGY | Facility: CLINIC | Age: 64
End: 2024-09-16
Payer: COMMERCIAL

## 2024-09-16 DIAGNOSIS — H90.3 SENSORINEURAL HEARING LOSS (SNHL) OF BOTH EARS: Primary | ICD-10-CM

## 2024-10-23 NOTE — PROGRESS NOTES
"AUDIOLOGY REPORT    BACKGROUND: Dr. Blayne Calderon implanted Sara Ritter with binaural  [employer] HiRes 90k cochlear implants due to bilateral severe to profound sensorineural hearing loss and lack of benefit from hearing aids.  She received the left implant 11/24/2003 (after failure of [place] 1.2) and the right implant 10/11/2007.  The right implant was repositioned in June 2010.  She favors her left implant.    The patient is being seen on 10/29/2024 at the [facility] for bilateral cochlear implant follow up. Dr. Andra Smith ordered today's visit.     PATIENT REPORT: Sara was last in clinic about 6 weeks ago, when she upgraded to [place] processors bilaterally.  She reports:  She would like reprogramming has speech sounds \"muddy\" and it feels like she is forcing her voice out of her throat.   She notes that when she is whistling, the processor cuts down the sound. Discussed that we have the programs set up without compression (linear) at her request and SoundRelax is off.  This is likely the Clear Voice low feature kicking in.  She does not want to turn off Clear Voice as she doesn't want more road noise in the car. Therefore, she would like to leave this unchanged.    The right magnet feels less secure than the left.   Her waterproof rechargeable batteries packs are still on backorder.  I sent a message to [employer] customer service asking them to update the patient.  Patient donated two [place] dri and stores and miscellaneous older cochlear implant accessories today including ComPilot and PartnerMic.       FITTING SESSION: Dr. Andra Smith ordered today's appointment. The patient came to the clinic for adjustment to the programs in the external speech processor and for assessment of the external components of the cochlear implant system. These components provide power and data to the internal device. Sound is only heard once the external portion is activated. " Postoperative treatment, including device fitting and adjustment, audiologic assessments, and training are required at regular intervals. Testing can include electropsychophysical measures of threshold, comfort, and loudness balancing, which are completed to update the program.    Processor type: Christin CI M90 bilaterally;  Backups: Christin CI Q90 and Marlyn bilaterally.        Headpiece type: Slim HP for M90; Universal headpiece (UHP) for older processors   Magnet strength: Slim HP: Right - Increased from 2 to 3 today; Patient was still sent home with #2 and she will monitor skin and return to using #2 if #3 is too strong.  Left - 2.  Currently no irritation or discomfort on either side.    UHP: 1 standard magnet left, 1 standard + 1 thin right.     TEST RESULTS:   Tympanograms: Did not test  Electrode Impedances: Essentially stable and normal on active electrodes bilaterally  Dataloggin.5 hours of use per day   Neural Response Testing: Did not test  Facial Stimulation: Absent  Tinnitus: Absent  Balance Problems: Absent  Pain/Discomfort: Absent  Strategies Tried: [place]-S in Christin CI, HiRes S in Marlyn    Christin CI M90 Programs Bilaterally:   Manual programs:  1. Start up program: Calm  2. Music  3. Speech in 360 - Left Focus, Direct Touch active  4. Off the ear for waterproof rechargeable battery pack  Patient has access to AutoSense but did not want it as the starting program.    NOTE: Patient prefers programs in Linear and prefers ClearVoice low. WindBlock was turned on in all programs except Music and streaming programs.     Christin CI Q90 Programs Bilaterally (Processors are initialized to work on either ear): Not changed today   1. (Left 153, Right 108) Everyday program - Clear Voice low, Processor bo + Tmic, IDR 60, no compression  2. (Left 153, Right 108) EchoBlock program - Clear Voice low, Processor bo + Tmic, IDR 60, no compression  3. (Left 153, Right 108) ZoomControl with front focus (will switch with  remote) - Clear Voice low, Processor bo + Tmic, IDR 60, no compression  4. (Left 153, Right 108) Biking program with WindBlock - Clear Voice low, Processor bo + Tmic, IDR 60, no compression  5. (Left 154, Right 109) Music - Clear Voice off, Processor bo + Tmic, IDR 80, no compression  QuickSync is enabled for programs and volume.  Standby mode is active.  ComPilot 50% in programs 1, 2, 4; 100% in programs 3 and 5      Marlyn Programs Bilaterally (All use HiResolution S): Patient did not bring for updating   1. (106 right; 151 left) Everyday, IDR 60, 30:70 mixing  2. (106 right; 151 left) 50:50 mixing program with IDR 60  3. (107 right; 152 left) Noise, IDR 50, sensitivity -3, 30:70 mixing     Patient reports she no longer has [place] backups.      Number of Channels per Program:   Right 13 (#1 and 16 have been off, 15 was also turned off today since patient feels sound more than she hears it)  Left 15 (#1 is off)    COMMENTS: Most comfortable (M) levels were measured on all active electrodes bilaterally.  Only slight reshaping was needed in right CI.  In left CI, increases to M levels were needed in middle frequencies and highest frequencies.  Upon going live, M levels were globally raised 8 units bilaterally to achieve comfort.  Patient reported quality of speech was significantly improved after the changes.  Volume was comfortable and balanced.     Patient reported that due to cost/finances, she would not like to undergo speech perception testing today.  She reports she would prefer to do this in the summer of 2025.     SUMMARY AND RECOMMENDATIONS: Sara was seen for bilateral cochlear implant programming of her [place] processors. Patient scheduled a follow up in June 2025 for fair testing and additional programming can also be completed at that time if needed.  If adjustments are needed before then, she will contact the clinic.      The patient expressed understanding and agreement with this plan.      Chiquis  Delmer Ahmadi, CCCRejiA, [facility]  Licensed Audiologist  [place] #[id number]      ADDENDUM 10/29/24: [employer] confirmed they are shipping the waterproof battery packs to the patient today. Patient notified.    Delmer Syed, CCC-A, [facility]  Licensed Audiologist  [place] #[id number]

## 2024-10-29 ENCOUNTER — OFFICE VISIT (OUTPATIENT)
Dept: AUDIOLOGY | Facility: CLINIC | Age: 64
End: 2024-10-29
Payer: COMMERCIAL

## 2024-10-29 DIAGNOSIS — H90.3 SENSORINEURAL HEARING LOSS (SNHL) OF BOTH EARS: Primary | ICD-10-CM

## 2025-04-28 DIAGNOSIS — Z96.21 COCHLEAR IMPLANT IN PLACE: Primary | ICD-10-CM

## 2025-06-03 NOTE — PROGRESS NOTES
AUDIOLOGY REPORT    BACKGROUND: Dr. Blayne Calderon implanted Sara Ritter with binaural  Advanced Bionics HiRes 90k cochlear implants due to bilateral severe to profound sensorineural hearing loss and lack of benefit from hearing aids.  She received the left implant 11/24/2003 (after failure of [place] 1.2) and the right implant 10/11/2007.  The right implant was repositioned in June 2010.  She favors her left implant.    The patient is being seen on 6/12/2025 at the [facility] for bilateral cochlear implant follow up. She was accompanied by her .  Dr. Andra Smith ordered today's visit.     PATIENT REPORT: Sara upgraded to Christin CI M90 processors in September 2024 and was last seen for follow up in October 2024.  She reports she needs reprogramming today and is ready for fair testing after adjustments.     FITTING SESSION: Dr. Andra Smith ordered today's appointment. The patient came to the clinic for adjustment to the programs in the external speech processor and for assessment of the external components of the cochlear implant system. These components provide power and data to the internal device. Sound is only heard once the external portion is activated. Postoperative treatment, including device fitting and adjustment, audiologic assessments, and training are required at regular intervals. Testing can include electropsychophysical measures of threshold, comfort, and loudness balancing, which are completed to update the program.    Processor type: Christin CI M90 bilaterally;  Backups: Christin CI Q90 and Marlyn bilaterally.        Headpiece type: Slim HP for M90; Universal headpiece (UHP) for older processors   Magnet strength: Slim HP: Right - 3; Left - 2.  Currently no irritation or discomfort on either side.    UHP: 1 standard magnet left, 1 standard + 1 thin right.     TEST RESULTS:   Tympanograms: Reported below  Electrode Impedances: Essentially stable and normal on active  electrodes bilaterally  Dataloggin.5 hours of use per day   Neural Response Testing: Did not test  Facial Stimulation: Absent  Tinnitus: Absent  Balance Problems: Absent  Pain/Discomfort: Absent  Strategies Tried: [place]-S in Christin CI, HiRes S in Marlyn    Christin CI M90 Programs Bilaterally:   Manual programs:  1. Start up program: Calm  2. Music  3. Speech in 360 - Left Focus, Direct Touch active  4. Off the ear for waterproof rechargeable battery pack  Patient has access to AutoSense but did not want it as the starting program.    NOTE: Patient prefers programs in Linear and prefers ClearVoice low. WindBlock was turned on in all programs except Music and streaming programs.     Christin CI Q90 Programs Bilaterally (Processors are initialized to work on either ear): Not changed today   1. (Left 153, Right 108) Everyday program - Clear Voice low, Processor bo + Tmic, IDR 60, no compression  2. (Left 153, Right 108) EchoBlock program - Clear Voice low, Processor bo + Tmic, IDR 60, no compression  3. (Left 153, Right 108) ZoomControl with front focus (will switch with remote) - Clear Voice low, Processor bo + Tmic, IDR 60, no compression  4. (Left 153, Right 108) Biking program with WindBlock - Clear Voice low, Processor bo + Tmic, IDR 60, no compression  5. (Left 154, Right 109) Music - Clear Voice off, Processor bo + Tmic, IDR 80, no compression  QuickSync is enabled for programs and volume.  Standby mode is active.  ComPilot 50% in programs 1, 2, 4; 100% in programs 3 and 5      Marlyn Programs Bilaterally (All use HiResolution S): Patient did not bring for updating   1. (106 right; 151 left) Everyday, IDR 60, 30:70 mixing  2. (106 right; 151 left) 50:50 mixing program with IDR 60  3. (107 right; 152 left) Noise, IDR 50, sensitivity -3, 30:70 mixing     Patient reports she no longer has [place] backups.      Number of Channels per Program:   Right 13 (#1 and 16 have been off, 15 was also turned off today since  patient feels sound more than she hears it)  Left 15 (#1 is off)    COMMENTS: Most comfortable (M) levels were measured on all active electrodes bilaterally.  Only slight reshaping was needed in right CI (highs down).  In left CI, decreases to M levels were needed in general.  Upon going live, M levels were globally raised 12 units bilaterally and then the map was tilted up in high frequencies since patient wanted more crispness.  Patient reported quality of speech was significantly improved after the changes.  Volume was comfortable and balanced.    Testing was completed after programming.      METHOD: Speech perception testing is conducted at regular intervals to determine the degree of benefit the patient is obtaining from the cochlear implant (CI). Tests are conducted using the cochlear implant without the benefit of lipreading. All tests are conducted in a sound-treated room. Perception of monosyllabic words and words in sentences are tested. Results usually show improvement over time. A decrease in performance indicates the need for re-programming of the prosthesis and/or replacement of components.     INTERVAL: Left 21.5 years, Right 17.5 years     TEST RESULTS:  45 minutes were spent assessing the patient s auditory rehabilitation status.      Device(s) used for Testing: ChemoCentryx CI M90 cochlear implant sound processors bilaterally. Normal listening check after microphone filter change.    Magnet strength: Right 3, Left 2 - no irritation or discomfort on either side     Tympanograms: Shallow bilaterally - similar to previous recordings and no otologic symptoms.     Unaided hearing: Did not test since no response bilaterally in past    Soundfield Thresholds:   Left CI: Detection in normal to mild loss range (stable)  Right CI: Detection in normal to mild loss range (stable)    CNC Words Test:  The patient repeats 25 single syllable words, auditory only. The words are presented at 60 dB SPL  (conversational level) delivered from a CD player.    Preoperative Performance: 2%    Left replacement CI:   In 2004, 6 months at 70 dB SPL: 50-70%   8 years at 60 dB SPL: 88%  12 years at 60 dB SPL: 84%  14 1/2 years at 60 dB A: 84%  19 years: 76% - stable  21.5 years (today): 80% - stable    Right CI:  In 2008, 3 months at 60 dB SPL: 56%  5 years at 60 dB SPL: 80%  8 years at 60 dB SPL: 68%  Almost 11 years at 60 dB A: 68%  15 years: 84% - slightly improved  17.5 years (today): 60% - stable from baseline     Bilateral CI:  5 years at 60 dB SPL: 92%  8 years at 60 dB SPL: 92%  Almost 11 years: did not test due to high score in left unilateral condition (unlikely to see bilateral advantage)  15 years: did not test due to high score in left unilateral condition (unlikely to see bilateral advantage)  17.5 years (today): did not test due to high score in left unilateral condition (unlikely to see bilateral advantage)    The Hearing in Noise Test (HINT):  The patient repeats 20 sentences, auditory only.   The sentences are presented in each condition at 60 dB SPL (conversational level)delivered from a CD player.    Preoperative Performance: 0%    Left replacement CI:   In 2004, 6 months at 70 dB SPL: 75-87%   8 years at 60 dB SPL: 100%  Did not test today due to previous ceiling results.     Right CI:  In 2008, 3 months at 60 dB SPL: 51%  5 years at 60 dB SPL: 100%  Did not test today due to previous ceiling results.       Acsendo Sentences Test:  The patient repeats 20 sentences, auditory only.  The sentences are presented at 60 dB A (conversational level) delivered from a CD player.     Preoperative Performance: Did not test    Left CI:  12 years: 99% quiet; 90% with +10 dB signal to noise ratio (SNR)  14 1/2 years: 97% quiet; 58% with +10 dB signal to noise ratio (SNR)  19 years: 95% quiet - stable  21.5 years (today): 98% quiet - stable    Right CI:  8 years: 89% quiet; 78% with +10 dB SNR  Almost 11 years: 95% quiet;  65% with +10 dB SNR   15 years: 89% quiet - stable   17.5 years (today): 91% quiet - stable     Bilateral CI:  April 2012 at +5 dB SNR: Non-Clear voice program: 23%; Clear Voice medium: 58%   8 years: 64% with +5 dB SNR (Did not test in quiet or +10 dB SNR due to high performance in unilateral conditions)   Almost 11 years: 77% with +10 dB SNR (Did not test in quiet due to high performance in unilateral conditions)   15 years: 75% with +10 dB SNR (Did not test in quiet due to high performance in unilateral conditions)   17.5 years (today): 83% with +10 dB SNR (Did not test in quiet due to high performance in unilateral conditions) - stable     SUMMARY AND RECOMMENDATIONS: Sara was seen for bilateral cochlear implant programming. After programming, audibility and speech reception scores were generally stable bilaterally. Patient reports she would like to return for programming again in 6 months.  She was advised to return every 1-2 years for testing.      Tympanograms were shallow as they have been in the past. Patient doesn't report any ear symptoms but will follow up with ENT if any concerns arise in the future.     The patient expressed understanding and agreement with this plan.      Katie Hall M.A  Audiology Doctoral Extern  [place] #[id number]    I was present with the patient for the entire Audiology appointment, including all procedures/testing performed by the Delmer student, and agree with the student s assessment and plan as documented.     Delmer Syed, CCC-A, [facility]  Licensed Audiologist  [place] #[id number]

## 2025-06-12 ENCOUNTER — OFFICE VISIT (OUTPATIENT)
Dept: AUDIOLOGY | Facility: CLINIC | Age: 65
End: 2025-06-12
Payer: COMMERCIAL

## 2025-06-12 DIAGNOSIS — Z96.21 COCHLEAR IMPLANT IN PLACE: ICD-10-CM

## 2025-06-12 DIAGNOSIS — H90.3 SENSORINEURAL HEARING LOSS (SNHL) OF BOTH EARS: Primary | ICD-10-CM

## 2025-07-14 ENCOUNTER — TELEPHONE (OUTPATIENT)
Dept: AUDIOLOGY | Facility: CLINIC | Age: 65
End: 2025-07-14
Payer: COMMERCIAL

## 2025-07-14 NOTE — TELEPHONE ENCOUNTER
Received email from patient requesting additional set of cochlear implant magnets for the headpieces for her waterproof cases.     Mailing #3 for right cochlear implant and #2 for left cochlear implant.    Delmer Syed, CCC-A, [facility]  Licensed Audiologist  [place] #[id number]